# Patient Record
(demographics unavailable — no encounter records)

---

## 2024-12-12 NOTE — CONSULT LETTER
[Dear  ___] : Dear  [unfilled], [Please see my note below.] : Please see my note below. [Consult Closing:] : Thank you very much for allowing me to participate in the care of this patient.  If you have any questions, please do not hesitate to contact me. [Sincerely,] : Sincerely, [Courtesy Letter:] : I had the pleasure of seeing your patient, [unfilled], in my office today. [FreeTextEntry3] : Rupa Hernandez DO [facility]

## 2024-12-12 NOTE — END OF VISIT
[Time Spent: ___ minutes] : I have spent [unfilled] minutes of time on the encounter which excludes teaching and separately reported services. [FreeTextEntry3] :  I have spent 45 minutes of time on the encounter which excludes separately reported services.

## 2024-12-12 NOTE — REASON FOR VISIT
[Established diagnosis: ______] : an established diagnosis of [unfilled] [Patient] : patient [Mother] : mother

## 2024-12-12 NOTE — PHYSICAL EXAM
[Well Developed] : well developed [NAD] : in no acute distress [Moist & Pink Mucous Membranes] : moist and pink mucous membranes [CTAB] : lungs clear to auscultation bilaterally [Soft] : soft [Normal Bowel Sounds] : normal bowel sounds [No HSM] : no hepatosplenomegaly appreciated [Normal Tone] : normal tone [Well-Perfused] : well-perfused [Interactive] : interactive [icteric] : anicteric [Respiratory Distress] : no respiratory distress  [Murmur] : no murmur [Distended] : non distended [Tender] : non tender [Focal Deficits] : no focal deficits [Edema] : no edema [Cyanosis] : no cyanosis [Rash] : no rash [Jaundice] : no jaundice

## 2024-12-12 NOTE — HISTORY OF PRESENT ILLNESS
[FreeTextEntry1] : Mahendra is a 9 year old male with MASLD who returns today with his mother for follow up. He was last seen in September 2024.   Last labs were done 9/12/24: AST/ALT 63/86 (98/173) All screening tests negative   8/24/24: HgbA1c 5.6%  8/24/24: Abdominal Ultrasound: Consistent with fatty liver  He has been well without complaints. He denies abdominal pain, nausea, vomiting, diarrhea, blood in stool, easy bleeding or bruising, rash, pruritus, jaundice, fevers, recurrent illnesses.   He has lost 3 pounds since his last visit 3 months ago. His weight is 110 (99th percentile) and BMI is 25.7 (99th percentile). He was eating a lot of carbs, large portions and drinking a lot of sugar drinks. However, he has made significant changes. Drinking mostly water, no juice. Eating less rice and bread, lots of protein. He loves soccer but does not play every day.   He had a Fibroscan done today using the M probe:  dB/m TE 4.5 kPa

## 2024-12-12 NOTE — ASSESSMENT
[Educated Patient & Family about Diagnosis] : educated the patient and family about the diagnosis [FreeTextEntry1] : 9 year old male with MASLD doing well with lifestyle modifications which correlates with weight loss and normalization of his Fibroscan. I commended him and mom on their hard work. We again discussed the importance of healthy eating, low carb/sugar diet, smaller portions, and exercise to help reduce weight regardless of NAFLD diagnosis.   1. Labs today  2. Continued healthy eating and exercise 3. Follow up in 6 months